# Patient Record
Sex: FEMALE | Race: OTHER | Employment: OTHER | URBAN - METROPOLITAN AREA
[De-identification: names, ages, dates, MRNs, and addresses within clinical notes are randomized per-mention and may not be internally consistent; named-entity substitution may affect disease eponyms.]

---

## 2020-11-05 ENCOUNTER — NEW PATIENT COMPREHENSIVE (OUTPATIENT)
Dept: URBAN - METROPOLITAN AREA CLINIC 35 | Facility: CLINIC | Age: 40
End: 2020-11-05

## 2020-11-05 DIAGNOSIS — H52.12: ICD-10-CM

## 2020-11-05 DIAGNOSIS — H52.4: ICD-10-CM

## 2020-11-05 DIAGNOSIS — H52.202: ICD-10-CM

## 2020-11-05 PROCEDURE — 92004 COMPRE OPH EXAM NEW PT 1/>: CPT

## 2020-11-05 PROCEDURE — 92015 DETERMINE REFRACTIVE STATE: CPT

## 2020-11-05 ASSESSMENT — VISUAL ACUITY
OD_SC: 20/20
OS_SC: 20/20-2
OS_SC: J1
OD_SC: J1

## 2020-11-05 ASSESSMENT — TONOMETRY
OD_IOP_MMHG: 14
OS_IOP_MMHG: 14

## 2022-06-03 NOTE — PATIENT DISCUSSION
Moderate rx change.  Discussed option of PAL or continuing DVO and using readers.  Advised adaptation to moderate change.  She's going to think about updating but sounds like she's going to stick DVO for now if she does.

## 2022-06-03 NOTE — PATIENT DISCUSSION
Continue xiidra BID OU.  Discussed option of sending via mail (has been purchasing at [facility] but discussed option of [employer].)  She may end up transitioning to [address] [place] [address] so she'll let us know what she wants to do.  Rx is prior authorized thru 10/2022 (see office mate for info).

## 2022-12-28 ENCOUNTER — COMPREHENSIVE EXAM (OUTPATIENT)
Dept: URBAN - METROPOLITAN AREA CLINIC 45 | Facility: CLINIC | Age: 42
End: 2022-12-28

## 2022-12-28 PROCEDURE — 92014 COMPRE OPH EXAM EST PT 1/>: CPT

## 2022-12-28 PROCEDURE — 92015 DETERMINE REFRACTIVE STATE: CPT

## 2022-12-28 ASSESSMENT — VISUAL ACUITY
OD_SC: J1
OD_SC: 20/20
OS_SC: J1
OS_SC: 20/20

## 2022-12-28 ASSESSMENT — TONOMETRY
OS_IOP_MMHG: 12
OD_IOP_MMHG: 11